# Patient Record
Sex: FEMALE | NOT HISPANIC OR LATINO | URBAN - METROPOLITAN AREA
[De-identification: names, ages, dates, MRNs, and addresses within clinical notes are randomized per-mention and may not be internally consistent; named-entity substitution may affect disease eponyms.]

---

## 2024-11-04 ENCOUNTER — APPOINTMENT (RX ONLY)
Dept: URBAN - METROPOLITAN AREA CLINIC 330 | Facility: CLINIC | Age: 74
Setting detail: DERMATOLOGY
End: 2024-11-04

## 2024-11-04 DIAGNOSIS — Z85.828 PERSONAL HISTORY OF OTHER MALIGNANT NEOPLASM OF SKIN: ICD-10-CM

## 2024-11-04 DIAGNOSIS — L82.0 INFLAMED SEBORRHEIC KERATOSIS: ICD-10-CM

## 2024-11-04 DIAGNOSIS — Z71.89 OTHER SPECIFIED COUNSELING: ICD-10-CM

## 2024-11-04 DIAGNOSIS — D22 MELANOCYTIC NEVI: ICD-10-CM | Status: STABLE

## 2024-11-04 DIAGNOSIS — L57.8 OTHER SKIN CHANGES DUE TO CHRONIC EXPOSURE TO NONIONIZING RADIATION: ICD-10-CM

## 2024-11-04 DIAGNOSIS — L81.4 OTHER MELANIN HYPERPIGMENTATION: ICD-10-CM

## 2024-11-04 PROBLEM — D22.5 MELANOCYTIC NEVI OF TRUNK: Status: ACTIVE | Noted: 2024-11-04

## 2024-11-04 PROCEDURE — ? PHOTO-DOCUMENTATION

## 2024-11-04 PROCEDURE — ? LIQUID NITROGEN

## 2024-11-04 PROCEDURE — 99203 OFFICE O/P NEW LOW 30 MIN: CPT | Mod: 25

## 2024-11-04 PROCEDURE — ? OTHER

## 2024-11-04 PROCEDURE — 17111 DESTRUCTION B9 LESIONS 15/>: CPT

## 2024-11-04 PROCEDURE — ? EDUCATIONAL RESOURCES PROVIDED

## 2024-11-04 PROCEDURE — ? COUNSELING

## 2024-11-04 PROCEDURE — ? TREATMENT REGIMEN

## 2024-11-04 PROCEDURE — ? FULL BODY SKIN EXAM

## 2024-11-04 PROCEDURE — ? OBSERVATION

## 2024-11-04 ASSESSMENT — LOCATION ZONE DERM
LOCATION ZONE: SCALP
LOCATION ZONE: NECK
LOCATION ZONE: TRUNK
LOCATION ZONE: FACE
LOCATION ZONE: AXILLAE

## 2024-11-04 ASSESSMENT — LOCATION DETAILED DESCRIPTION DERM
LOCATION DETAILED: LEFT MEDIAL BREAST 9-10:00 REGION
LOCATION DETAILED: LEFT MEDIAL BREAST 8-9:00 REGION
LOCATION DETAILED: LEFT CHIN
LOCATION DETAILED: INFERIOR THORACIC SPINE
LOCATION DETAILED: LEFT CENTRAL TEMPLE
LOCATION DETAILED: RIGHT CENTRAL FRONTAL SCALP
LOCATION DETAILED: LEFT AXILLARY VAULT
LOCATION DETAILED: LEFT AXILLARY TAIL OF BREAST
LOCATION DETAILED: LEFT RIB CAGE
LOCATION DETAILED: SUBXIPHOID
LOCATION DETAILED: LEFT LATERAL SUPERIOR CHEST
LOCATION DETAILED: RIGHT CENTRAL ZYGOMA
LOCATION DETAILED: RIGHT MID-UPPER BACK
LOCATION DETAILED: RIGHT MEDIAL BREAST 5-6:00 REGION
LOCATION DETAILED: LEFT SUPERIOR LATERAL NECK
LOCATION DETAILED: RIGHT SUPERIOR FOREHEAD
LOCATION DETAILED: RIGHT SUPERIOR POSTERIOR NECK
LOCATION DETAILED: SUPERIOR THORACIC SPINE
LOCATION DETAILED: LEFT INFERIOR POSTAURICULAR SKIN

## 2024-11-04 ASSESSMENT — LOCATION SIMPLE DESCRIPTION DERM
LOCATION SIMPLE: ABDOMEN
LOCATION SIMPLE: LEFT AXILLARY VAULT
LOCATION SIMPLE: CHIN
LOCATION SIMPLE: SCALP
LOCATION SIMPLE: LEFT TEMPLE
LOCATION SIMPLE: RIGHT BREAST
LOCATION SIMPLE: UPPER BACK
LOCATION SIMPLE: RIGHT FOREHEAD
LOCATION SIMPLE: POSTERIOR NECK
LOCATION SIMPLE: NECK
LOCATION SIMPLE: CHEST
LOCATION SIMPLE: LEFT BREAST
LOCATION SIMPLE: RIGHT ZYGOMA
LOCATION SIMPLE: RIGHT UPPER BACK
LOCATION SIMPLE: RIGHT SCALP

## 2024-11-04 NOTE — PROCEDURE: OTHER
Detail Level: Generalized
Render Risk Assessment In Note?: yes
Note Text (......Xxx Chief Complaint.): This diagnosis correlates with the
Other (Free Text): Patient consent was obtained to proceed with the visit and recommended plan of care after discussion of all risks and benefits, including the risks of COVID-19 exposure.
Other (Free Text): History bBasal Cell Carcinoma, provided by patient
Detail Level: Simple

## 2024-11-04 NOTE — PROCEDURE: LIQUID NITROGEN
Post-Care Instructions: I reviewed with the patient in detail post-care instructions. Patient is to wear sunprotection, and avoid picking at any of the treated lesions. Pt may apply Vaseline to crusted or scabbing areas.
Show Applicator Variable?: Yes
Spray Paint Text: The liquid nitrogen was applied to the skin utilizing a spray paint frosting technique.
Add 52 Modifier (Optional): no
Consent: The patient's consent was obtained including but not limited to risks of crusting, scabbing, blistering, scarring, darker or lighter pigmentary change, recurrence, incomplete removal and infection.
Application Tool (Optional): Liquid Nitrogen Sprayer
Number Of Freeze-Thaw Cycles: 2 freeze-thaw cycles
Medical Necessity Clause: This procedure was medically necessary because the lesions that were treated were:
Medical Necessity Information: It is in your best interest to select a reason for this procedure from the list below. All of these items fulfill various CMS LCD requirements except the new and changing color options.
Aperture Size (Optional): C
Detail Level: Detailed
Duration Of Freeze Thaw-Cycle (Seconds): 5-10

## 2025-05-13 ENCOUNTER — APPOINTMENT (OUTPATIENT)
Dept: URBAN - METROPOLITAN AREA CLINIC 330 | Facility: CLINIC | Age: 75
Setting detail: DERMATOLOGY
End: 2025-05-13

## 2025-05-13 DIAGNOSIS — L82.0 INFLAMED SEBORRHEIC KERATOSIS: ICD-10-CM

## 2025-05-13 DIAGNOSIS — L20.89 OTHER ATOPIC DERMATITIS: ICD-10-CM | Status: WELL CONTROLLED

## 2025-05-13 DIAGNOSIS — L57.8 OTHER SKIN CHANGES DUE TO CHRONIC EXPOSURE TO NONIONIZING RADIATION: ICD-10-CM

## 2025-05-13 DIAGNOSIS — L57.0 ACTINIC KERATOSIS: ICD-10-CM

## 2025-05-13 DIAGNOSIS — D22 MELANOCYTIC NEVI: ICD-10-CM | Status: STABLE

## 2025-05-13 DIAGNOSIS — L81.4 OTHER MELANIN HYPERPIGMENTATION: ICD-10-CM | Status: STABLE

## 2025-05-13 DIAGNOSIS — Z85.828 PERSONAL HISTORY OF OTHER MALIGNANT NEOPLASM OF SKIN: ICD-10-CM

## 2025-05-13 PROBLEM — D22.5 MELANOCYTIC NEVI OF TRUNK: Status: ACTIVE | Noted: 2025-05-13

## 2025-05-13 PROCEDURE — ? COUNSELING

## 2025-05-13 PROCEDURE — 17110 DESTRUCTION B9 LES UP TO 14: CPT

## 2025-05-13 PROCEDURE — ? OTHER

## 2025-05-13 PROCEDURE — ? PRESCRIPTION MEDICATION MANAGEMENT

## 2025-05-13 PROCEDURE — 17000 DESTRUCT PREMALG LESION: CPT | Mod: 59

## 2025-05-13 PROCEDURE — ? TREATMENT REGIMEN

## 2025-05-13 PROCEDURE — 99213 OFFICE O/P EST LOW 20 MIN: CPT | Mod: 25

## 2025-05-13 PROCEDURE — ? LIQUID NITROGEN

## 2025-05-13 PROCEDURE — ? PHOTO-DOCUMENTATION

## 2025-05-13 PROCEDURE — ? FULL BODY SKIN EXAM

## 2025-05-13 PROCEDURE — ? MEDICAL PHOTOGRAPHY REVIEW

## 2025-05-13 PROCEDURE — 17003 DESTRUCT PREMALG LES 2-14: CPT | Mod: 59

## 2025-05-13 PROCEDURE — ? PRESCRIPTION

## 2025-05-13 RX ORDER — HYDROCORTISONE 25 MG/G
CREAM TOPICAL
Qty: 30 | Refills: 2 | Status: ERX | COMMUNITY
Start: 2025-05-13

## 2025-05-13 RX ORDER — HYDROQUINONE 40 MG/G
CREAM TOPICAL
Qty: 57 | Refills: 0 | COMMUNITY
Start: 2025-05-13

## 2025-05-13 RX ADMIN — HYDROQUINONE: 40 CREAM TOPICAL at 00:00

## 2025-05-13 RX ADMIN — HYDROCORTISONE: 25 CREAM TOPICAL at 00:00

## 2025-05-13 ASSESSMENT — LOCATION DETAILED DESCRIPTION DERM
LOCATION DETAILED: LEFT LATERAL MALAR CHEEK
LOCATION DETAILED: RIGHT SUPERIOR LATERAL NECK
LOCATION DETAILED: LEFT LATERAL SUPERIOR CHEST
LOCATION DETAILED: LEFT INFERIOR POSTAURICULAR SKIN
LOCATION DETAILED: RIGHT CENTRAL LATERAL NECK
LOCATION DETAILED: RIGHT CENTRAL POSTERIOR NECK
LOCATION DETAILED: RIGHT CENTRAL FRONTAL SCALP
LOCATION DETAILED: RIGHT SUPERIOR LATERAL MALAR CHEEK
LOCATION DETAILED: RIGHT LATERAL MALAR CHEEK
LOCATION DETAILED: SUPERIOR THORACIC SPINE
LOCATION DETAILED: LEFT CHIN
LOCATION DETAILED: LEFT INFERIOR MEDIAL MALAR CHEEK
LOCATION DETAILED: LEFT CENTRAL ZYGOMA
LOCATION DETAILED: INFERIOR THORACIC SPINE

## 2025-05-13 ASSESSMENT — LOCATION SIMPLE DESCRIPTION DERM
LOCATION SIMPLE: LEFT ZYGOMA
LOCATION SIMPLE: RIGHT SCALP
LOCATION SIMPLE: LEFT CHEEK
LOCATION SIMPLE: RIGHT CHEEK
LOCATION SIMPLE: NECK
LOCATION SIMPLE: CHIN
LOCATION SIMPLE: CHEST
LOCATION SIMPLE: SCALP
LOCATION SIMPLE: UPPER BACK

## 2025-05-13 ASSESSMENT — LOCATION ZONE DERM
LOCATION ZONE: NECK
LOCATION ZONE: FACE
LOCATION ZONE: SCALP
LOCATION ZONE: TRUNK

## 2025-05-13 ASSESSMENT — BSA RASH: BSA RASH: 3

## 2025-05-13 ASSESSMENT — SEVERITY ASSESSMENT 2020: SEVERITY 2020: ALMOST CLEAR

## 2025-05-13 NOTE — PROCEDURE: PRESCRIPTION MEDICATION MANAGEMENT
Render In Strict Bullet Format?: No
Detail Level: Simple
Plan: Discussed bleaching cream, discussed Tretinoin

## 2025-05-13 NOTE — PROCEDURE: OTHER
Other (Free Text): History of Basal Cell Carcinoma, provided by patient
Render Risk Assessment In Note?: yes
Detail Level: Simple
Note Text (......Xxx Chief Complaint.): This diagnosis correlates with the
Detail Level: Generalized
Other (Free Text): Patient consent was obtained to proceed with the visit and recommended plan of care after discussion of all risks and benefits, including the risks of COVID-19 exposure.

## 2025-05-13 NOTE — PROCEDURE: COUNSELING
Detail Level: Simple
Detail Level: Generalized
Detail Level: Detailed
Sunscreen Recommendations: SPF 30. Apply every two hours.

## 2025-05-13 NOTE — PROCEDURE: REASSURANCE
Hide Include Location In Plan Question?: No
Detail Level: Detailed
Additional Note: Compared to photo this lesion has remained stable.

## 2025-05-13 NOTE — PROCEDURE: LIQUID NITROGEN
Medical Necessity Clause: This procedure was medically necessary because the lesions that were treated were:
Spray Paint Technique: No
Application Tool (Optional): Liquid Nitrogen Sprayer
Aperture Size (Optional): C
Duration Of Freeze Thaw-Cycle (Seconds): 5-10
Number Of Freeze-Thaw Cycles: 2 freeze-thaw cycles
Medical Necessity Information: It is in your best interest to select a reason for this procedure from the list below. All of these items fulfill various CMS LCD requirements except the new and changing color options.
Post-Care Instructions: I reviewed with the patient in detail post-care instructions. Patient is to wear sunprotection, and avoid picking at any of the treated lesions. Pt may apply Vaseline to crusted or scabbing areas.
Show Topical Anesthesia Variable?: Yes
Spray Paint Text: The liquid nitrogen was applied to the skin utilizing a spray paint frosting technique.
Detail Level: Detailed
Consent: The patient's consent was obtained including but not limited to risks of crusting, scabbing, blistering, scarring, darker or lighter pigmentary change, recurrence, incomplete removal and infection.
Duration Of Freeze Thaw-Cycle (Seconds): 2
Number Of Freeze-Thaw Cycles: 3 freeze-thaw cycles

## 2025-07-21 ENCOUNTER — APPOINTMENT (OUTPATIENT)
Dept: URBAN - METROPOLITAN AREA CLINIC 330 | Facility: CLINIC | Age: 75
Setting detail: DERMATOLOGY
End: 2025-07-21

## 2025-07-21 DIAGNOSIS — L82.0 INFLAMED SEBORRHEIC KERATOSIS: ICD-10-CM

## 2025-07-21 PROBLEM — D48.5 NEOPLASM OF UNCERTAIN BEHAVIOR OF SKIN: Status: ACTIVE | Noted: 2025-07-21

## 2025-07-21 PROCEDURE — ? LIQUID NITROGEN

## 2025-07-21 PROCEDURE — ? BIOPSY BY SHAVE METHOD

## 2025-07-21 PROCEDURE — ? COUNSELING

## 2025-07-21 ASSESSMENT — LOCATION SIMPLE DESCRIPTION DERM
LOCATION SIMPLE: RIGHT CHEEK
LOCATION SIMPLE: LEFT CHEEK
LOCATION SIMPLE: RIGHT ZYGOMA
LOCATION SIMPLE: LEFT ZYGOMA

## 2025-07-21 ASSESSMENT — LOCATION DETAILED DESCRIPTION DERM
LOCATION DETAILED: LEFT INFERIOR PREAURICULAR CHEEK
LOCATION DETAILED: LEFT LATERAL MALAR CHEEK
LOCATION DETAILED: LEFT LATERAL ZYGOMA
LOCATION DETAILED: RIGHT CENTRAL ZYGOMA
LOCATION DETAILED: LEFT SUPERIOR LATERAL MALAR CHEEK
LOCATION DETAILED: RIGHT SUPERIOR LATERAL MALAR CHEEK
LOCATION DETAILED: RIGHT LATERAL MALAR CHEEK

## 2025-07-21 ASSESSMENT — LOCATION ZONE DERM: LOCATION ZONE: FACE

## 2025-07-21 NOTE — PROCEDURE: LIQUID NITROGEN
Render Note In Bullet Format When Appropriate: No
Show Applicator Variable?: Yes
Medical Necessity Clause: This procedure was medically necessary because the lesions that were treated were:
Post-Care Instructions: I reviewed with the patient in detail post-care instructions. Patient is to wear sunprotection, and avoid picking at any of the treated lesions. Pt may apply Vaseline to crusted or scabbing areas.
Medical Necessity Information: It is in your best interest to select a reason for this procedure from the list below. All of these items fulfill various CMS LCD requirements except the new and changing color options.
Spray Paint Text: The liquid nitrogen was applied to the skin utilizing a spray paint frosting technique.
Detail Level: Detailed
Consent: The patient's consent was obtained including but not limited to risks of crusting, scabbing, blistering, scarring, darker or lighter pigmentary change, recurrence, incomplete removal and infection.

## 2025-07-21 NOTE — PROCEDURE: MIPS QUALITY
Quality 226: Preventive Care And Screening: Tobacco Use: Screening And Cessation Intervention: Patient screened for tobacco use and is an ex/non-smoker
Detail Level: Detailed
Quality 110: Preventive Care And Screening: Influenza Immunization: Influenza Immunization Administered during Influenza season
Quality 47: Advance Care Plan: Advance care planning not documented, reason not otherwise specified.
Quality 402: Tobacco Use And Help With Quitting Among Adolescents: Patient screened for tobacco and never smoked
Quality 130: Documentation Of Current Medications In The Medical Record: Current Medications Documented
Quality 431: Preventive Care And Screening: Unhealthy Alcohol Use - Screening: Patient not identified as an unhealthy alcohol user when screened for unhealthy alcohol use using a systematic screening method